# Patient Record
Sex: MALE | Race: WHITE
[De-identification: names, ages, dates, MRNs, and addresses within clinical notes are randomized per-mention and may not be internally consistent; named-entity substitution may affect disease eponyms.]

---

## 2018-10-25 ENCOUNTER — HOSPITAL ENCOUNTER (OUTPATIENT)
Dept: HOSPITAL 19 - COL.RAD | Age: 38
End: 2018-10-25
Attending: FAMILY MEDICINE
Payer: COMMERCIAL

## 2018-10-25 DIAGNOSIS — R93.0: ICD-10-CM

## 2018-10-25 DIAGNOSIS — R51: Primary | ICD-10-CM

## 2019-09-07 ENCOUNTER — HOSPITAL ENCOUNTER (EMERGENCY)
Dept: HOSPITAL 19 - COL.ER | Age: 39
Discharge: HOME | End: 2019-09-07
Payer: COMMERCIAL

## 2019-09-07 VITALS — SYSTOLIC BLOOD PRESSURE: 119 MMHG | DIASTOLIC BLOOD PRESSURE: 85 MMHG | HEART RATE: 67 BPM

## 2019-09-07 VITALS — BODY MASS INDEX: 31.2 KG/M2 | HEIGHT: 72.01 IN | WEIGHT: 230.38 LBS

## 2019-09-07 DIAGNOSIS — F32.9: ICD-10-CM

## 2019-09-07 DIAGNOSIS — R07.89: Primary | ICD-10-CM

## 2019-09-07 LAB
ALBUMIN SERPL-MCNC: 4.8 GM/DL (ref 3.5–5)
ALP SERPL-CCNC: 78 U/L (ref 50–136)
ALT SERPL-CCNC: 18 U/L (ref 21–72)
ANION GAP SERPL CALC-SCNC: 12 MMOL/L (ref 7–16)
AST SERPL-CCNC: 26 U/L (ref 15–37)
BASOPHILS # BLD: 0.1 10*3/UL (ref 0–0.2)
BASOPHILS NFR BLD AUTO: 0.8 % (ref 0–2)
BILIRUB SERPL-MCNC: 0.5 MG/DL (ref 0–1)
BUN SERPL-MCNC: 18 MG/DL (ref 9–20)
CALCIUM SERPL-MCNC: 9.2 MG/DL (ref 8.4–10.2)
CHLORIDE SERPL-SCNC: 106 MMOL/L (ref 98–107)
CO2 SERPL-SCNC: 24 MMOL/L (ref 22–30)
CREAT SERPL-SCNC: 1 UMOL/L (ref 0.66–1.25)
CRP SERPL-MCNC: 0.5 MG/DL (ref 0–0.9)
EOSINOPHIL # BLD: 0.2 10*3/UL (ref 0–0.7)
EOSINOPHIL NFR BLD: 1.3 % (ref 0–4)
ERYTHROCYTE [DISTWIDTH] IN BLOOD BY AUTOMATED COUNT: 13.5 % (ref 11.5–14.5)
GLUCOSE SERPL-MCNC: 94 MG/DL (ref 74–106)
GRANULOCYTES # BLD AUTO: 68.2 % (ref 42.2–75.2)
HCT VFR BLD AUTO: 45.6 % (ref 42–52)
HGB BLD-MCNC: 15.5 G/DL (ref 13.5–18)
LIPASE SERPL-CCNC: 91 U/L (ref 23–300)
LYMPHOCYTES # BLD: 2.7 10*3/UL (ref 1.2–3.4)
LYMPHOCYTES NFR BLD: 21.6 % (ref 20–51)
MCH RBC QN AUTO: 29 PG (ref 27–31)
MCHC RBC AUTO-ENTMCNC: 34 G/DL (ref 33–37)
MCV RBC AUTO: 86 FL (ref 80–100)
MONOCYTES # BLD: 1 10*3/UL (ref 0.1–0.6)
MONOCYTES NFR BLD AUTO: 7.9 % (ref 1.7–9.3)
NEUTROPHILS # BLD: 8.6 10*3/UL (ref 1.4–6.5)
PLATELET # BLD AUTO: 318 K/MM3 (ref 130–400)
PMV BLD AUTO: 11 FL (ref 7.4–10.4)
POTASSIUM SERPL-SCNC: 3.8 MMOL/L (ref 3.4–5)
PROT SERPL-MCNC: 8.1 GM/DL (ref 6.4–8.2)
RBC # BLD AUTO: 5.32 M/MM3 (ref 4.2–5.6)
SODIUM SERPL-SCNC: 143 MMOL/L (ref 137–145)
TROPONIN I SERPL-MCNC: < 0.012 NG/ML (ref 0–0.04)

## 2022-03-21 ENCOUNTER — HOSPITAL ENCOUNTER (EMERGENCY)
Dept: HOSPITAL 19 - COL.ER | Age: 42
Discharge: HOME | End: 2022-03-21
Attending: EMERGENCY MEDICINE
Payer: COMMERCIAL

## 2022-03-21 VITALS — HEIGHT: 72.01 IN | WEIGHT: 239.42 LBS | BODY MASS INDEX: 32.43 KG/M2

## 2022-03-21 VITALS — DIASTOLIC BLOOD PRESSURE: 58 MMHG | HEART RATE: 67 BPM | SYSTOLIC BLOOD PRESSURE: 111 MMHG

## 2022-03-21 VITALS — TEMPERATURE: 97.4 F

## 2022-03-21 DIAGNOSIS — R07.89: ICD-10-CM

## 2022-03-21 DIAGNOSIS — F41.9: Primary | ICD-10-CM

## 2022-03-21 DIAGNOSIS — Z20.822: ICD-10-CM

## 2022-03-21 LAB
ALBUMIN SERPL-MCNC: 4.2 GM/DL (ref 3.5–5)
ALP SERPL-CCNC: 86 U/L (ref 40–150)
ALT SERPL-CCNC: 30 U/L (ref 0–55)
ANION GAP SERPL CALC-SCNC: 10 MMOL/L (ref 7–16)
AST SERPL-CCNC: 19 U/L (ref 5–34)
BASOPHILS # BLD: 0.1 K/MM3 (ref 0–0.2)
BASOPHILS NFR BLD AUTO: 0.8 % (ref 0–2)
BILIRUB SERPL-MCNC: 0.4 MG/DL (ref 0.2–1.2)
BUN SERPL-MCNC: 17 MG/DL (ref 9–21)
CALCIUM SERPL-MCNC: 9.1 MG/DL (ref 8.4–10.2)
CHLORIDE SERPL-SCNC: 106 MMOL/L (ref 98–107)
CO2 SERPL-SCNC: 23 MMOL/L (ref 22–29)
CREAT SERPL-SCNC: 0.98 MG/DL (ref 0.72–1.25)
EOSINOPHIL # BLD: 0.2 K/MM3 (ref 0–0.7)
EOSINOPHIL NFR BLD: 1.7 % (ref 0–4)
ERYTHROCYTE [DISTWIDTH] IN BLOOD BY AUTOMATED COUNT: 13.7 % (ref 11.5–14.5)
GLUCOSE SERPL-MCNC: 95 MG/DL (ref 70–99)
GRANULOCYTES # BLD AUTO: 69 % (ref 42.2–75.2)
HCT VFR BLD AUTO: 44.9 % (ref 42–52)
HGB BLD-MCNC: 15.3 G/DL (ref 13.5–18)
LYMPHOCYTES # BLD: 2.3 K/MM3 (ref 1.2–3.4)
LYMPHOCYTES NFR BLD: 19.1 % (ref 20–51)
MCH RBC QN AUTO: 29 PG (ref 27–31)
MCHC RBC AUTO-ENTMCNC: 34 G/DL (ref 33–37)
MCV RBC AUTO: 86 FL (ref 80–100)
MONOCYTES # BLD: 1.1 K/MM3 (ref 0.1–0.6)
MONOCYTES NFR BLD AUTO: 8.9 % (ref 1.7–9.3)
NEUTROPHILS # BLD: 8.1 K/MM3 (ref 1.4–6.5)
PLATELET # BLD AUTO: 334 K/MM3 (ref 130–400)
PMV BLD AUTO: 11.1 FL (ref 7.4–10.4)
POTASSIUM SERPL-SCNC: 3.8 MMOL/L (ref 3.5–4.5)
PROT SERPL-MCNC: 7.4 GM/DL (ref 6.2–8.1)
RBC # BLD AUTO: 5.24 M/MM3 (ref 4.2–5.6)
SODIUM SERPL-SCNC: 139 MMOL/L (ref 136–145)
TROPONIN I SERPL-MCNC: < 0.01 NG/ML (ref 0–0.03)

## 2022-04-08 ENCOUNTER — HOSPITAL ENCOUNTER (OUTPATIENT)
Dept: HOSPITAL 19 - COL.VAS | Age: 42
End: 2022-04-08
Attending: FAMILY MEDICINE
Payer: COMMERCIAL

## 2022-04-08 DIAGNOSIS — R07.9: Primary | ICD-10-CM

## 2022-04-08 DIAGNOSIS — R06.02: ICD-10-CM

## 2022-08-31 ENCOUNTER — HOSPITAL ENCOUNTER (OUTPATIENT)
Dept: HOSPITAL 19 - COL.RAD | Age: 42
End: 2022-08-31
Attending: FAMILY MEDICINE
Payer: COMMERCIAL

## 2022-08-31 DIAGNOSIS — R59.0: Primary | ICD-10-CM

## 2022-09-03 ENCOUNTER — HOSPITAL ENCOUNTER (OUTPATIENT)
Dept: HOSPITAL 19 - COL.ER | Age: 42
Setting detail: OBSERVATION
LOS: 1 days | Discharge: HOME | End: 2022-09-04
Attending: INTERNAL MEDICINE | Admitting: INTERNAL MEDICINE
Payer: COMMERCIAL

## 2022-09-03 VITALS — SYSTOLIC BLOOD PRESSURE: 113 MMHG | HEART RATE: 93 BPM | DIASTOLIC BLOOD PRESSURE: 52 MMHG | TEMPERATURE: 98.8 F

## 2022-09-03 VITALS — DIASTOLIC BLOOD PRESSURE: 63 MMHG | TEMPERATURE: 98.3 F | HEART RATE: 91 BPM | SYSTOLIC BLOOD PRESSURE: 119 MMHG

## 2022-09-03 VITALS — HEIGHT: 72.01 IN | BODY MASS INDEX: 30.76 KG/M2 | WEIGHT: 227.08 LBS

## 2022-09-03 VITALS — TEMPERATURE: 97.5 F | HEART RATE: 88 BPM | SYSTOLIC BLOOD PRESSURE: 106 MMHG | DIASTOLIC BLOOD PRESSURE: 59 MMHG

## 2022-09-03 DIAGNOSIS — Z20.822: ICD-10-CM

## 2022-09-03 DIAGNOSIS — R59.0: ICD-10-CM

## 2022-09-03 DIAGNOSIS — F32.A: ICD-10-CM

## 2022-09-03 DIAGNOSIS — R79.89: ICD-10-CM

## 2022-09-03 DIAGNOSIS — B27.90: ICD-10-CM

## 2022-09-03 DIAGNOSIS — Z79.899: ICD-10-CM

## 2022-09-03 DIAGNOSIS — D73.5: Primary | ICD-10-CM

## 2022-09-03 DIAGNOSIS — D72.829: ICD-10-CM

## 2022-09-03 LAB
ALBUMIN SERPL-MCNC: 3.1 GM/DL (ref 3.5–5)
ALP SERPL-CCNC: 289 U/L (ref 40–150)
ALT SERPL-CCNC: 378 U/L (ref 0–55)
ANION GAP SERPL CALC-SCNC: 14 MMOL/L (ref 7–16)
ANISOCYTOSIS BLD QL: (no result)
AST SERPL-CCNC: 199 U/L (ref 5–34)
BILIRUB SERPL-MCNC: 11.1 MG/DL (ref 0.2–1.2)
BUN SERPL-MCNC: 16 MG/DL (ref 9–21)
CALCIUM SERPL-MCNC: 8.7 MG/DL (ref 8.4–10.2)
CHLORIDE SERPL-SCNC: 103 MMOL/L (ref 98–107)
CO2 SERPL-SCNC: 19 MMOL/L (ref 22–29)
CREAT SERPL-SCNC: 1.13 MG/DL (ref 0.72–1.25)
ERYTHROCYTE [DISTWIDTH] IN BLOOD BY AUTOMATED COUNT: 15.9 % (ref 11.5–14.5)
GLUCOSE SERPL-MCNC: 124 MG/DL (ref 70–99)
GLUCOSE UR QL STRIP.AUTO: (no result)
HCT VFR BLD AUTO: 39.6 % (ref 42–52)
HETEROPH AB SER QL LA: POSITIVE
HGB BLD-MCNC: 13.4 G/DL (ref 13.5–18)
INR BLD: 1.3 (ref 0.8–3)
LIPASE SERPL-CCNC: 61 U/L (ref 8–78)
LYMPHOCYTES NFR BLD MANUAL: 53 % (ref 20–51)
MCH RBC QN AUTO: 29 PG (ref 27–31)
MCHC RBC AUTO-ENTMCNC: 34 G/DL (ref 33–37)
MCV RBC AUTO: 85 FL (ref 80–100)
MONOCYTES NFR BLD: 5 % (ref 1.7–9.3)
NEUTS BAND NFR BLD: 5 % (ref 0–10)
NEUTS SEG NFR BLD MANUAL: 37 % (ref 42–75.2)
PH UR STRIP.AUTO: 6 [PH] (ref 5–8.5)
PLATELET # BLD AUTO: 316 K/MM3 (ref 130–400)
PLATELET BLD QL SMEAR: NORMAL
PMV BLD AUTO: 11.1 FL (ref 7.4–10.4)
POTASSIUM SERPL-SCNC: 3.7 MMOL/L (ref 3.5–4.5)
PROT SERPL-MCNC: 6.9 GM/DL (ref 6.2–8.1)
PROTHROMBIN TIME: 15.2 SECONDS (ref 9.7–12.8)
RBC # BLD AUTO: 4.66 M/MM3 (ref 4.2–5.6)
RBC # UR: (no result) /HPF (ref 0–2)
SODIUM SERPL-SCNC: 136 MMOL/L (ref 136–145)
SP GR UR STRIP.AUTO: <=1.005 (ref 1–1.03)
SQUAMOUS # URNS: (no result) /HPF (ref 0–10)
UA DIPSTICK PNL UR STRIP.AUTO: (no result)
URN COLLECT METHOD CLASS: (no result)
UROBILINOGEN UR STRIP.AUTO-MCNC: >=8 E.U/DL (ref 0.2–1)

## 2022-09-03 PROCEDURE — G0378 HOSPITAL OBSERVATION PER HR: HCPCS

## 2022-09-03 NOTE — NUR
Initial shift assessment completed, watching some TV, wife at bedside,, denies
pain at this time but overall just doesnt feel his normal-tired,  jaundice
present, up in room as tolerated, steady on feet, IV fluids of LR at 100cc/hr.

## 2022-09-04 VITALS — TEMPERATURE: 98.6 F | HEART RATE: 82 BPM | SYSTOLIC BLOOD PRESSURE: 103 MMHG | DIASTOLIC BLOOD PRESSURE: 52 MMHG

## 2022-09-04 VITALS — HEART RATE: 79 BPM | TEMPERATURE: 98.3 F | DIASTOLIC BLOOD PRESSURE: 53 MMHG | SYSTOLIC BLOOD PRESSURE: 101 MMHG

## 2022-09-04 VITALS — TEMPERATURE: 97.5 F | DIASTOLIC BLOOD PRESSURE: 58 MMHG | SYSTOLIC BLOOD PRESSURE: 121 MMHG | HEART RATE: 78 BPM

## 2022-09-04 LAB
ALBUMIN SERPL-MCNC: 2.8 GM/DL (ref 3.5–5)
ALP SERPL-CCNC: 249 U/L (ref 40–150)
ALT SERPL-CCNC: 353 U/L (ref 0–55)
ANION GAP SERPL CALC-SCNC: 9 MMOL/L (ref 7–16)
AST SERPL-CCNC: 208 U/L (ref 5–34)
BASOPHILS NFR BLD MANUAL: 1 % (ref 0–2)
BILIRUB SERPL-MCNC: 10.2 MG/DL (ref 0.2–1.2)
BUN SERPL-MCNC: 13 MG/DL (ref 9–21)
CALCIUM SERPL-MCNC: 8.5 MG/DL (ref 8.4–10.2)
CHLORIDE SERPL-SCNC: 105 MMOL/L (ref 98–107)
CO2 SERPL-SCNC: 23 MMOL/L (ref 22–29)
CREAT SERPL-SCNC: 0.87 MG/DL (ref 0.72–1.25)
EOSINOPHIL NFR BLD: 1 % (ref 0–4)
ERYTHROCYTE [DISTWIDTH] IN BLOOD BY AUTOMATED COUNT: 16 % (ref 11.5–14.5)
GLUCOSE SERPL-MCNC: 101 MG/DL (ref 70–99)
HCT VFR BLD AUTO: 36.9 % (ref 42–52)
HGB BLD-MCNC: 12.3 G/DL (ref 13.5–18)
LYMPHOCYTES NFR BLD MANUAL: 64 % (ref 20–51)
MCH RBC QN AUTO: 29 PG (ref 27–31)
MCHC RBC AUTO-ENTMCNC: 33 G/DL (ref 33–37)
MCV RBC AUTO: 88 FL (ref 80–100)
MONOCYTES NFR BLD: 10 % (ref 1.7–9.3)
NEUTS BAND NFR BLD: 3 % (ref 0–10)
NEUTS SEG NFR BLD MANUAL: 21 % (ref 42–75.2)
PLATELET # BLD AUTO: 278 K/MM3 (ref 130–400)
PLATELET BLD QL SMEAR: NORMAL
PMV BLD AUTO: 11.5 FL (ref 7.4–10.4)
POTASSIUM SERPL-SCNC: 3.5 MMOL/L (ref 3.5–4.5)
PROT SERPL-MCNC: 6.1 GM/DL (ref 6.2–8.1)
RBC # BLD AUTO: 4.2 M/MM3 (ref 4.2–5.6)
SODIUM SERPL-SCNC: 137 MMOL/L (ref 136–145)

## 2022-09-04 NOTE — NUR
Shift assessment completed. Scheduled medications given. Patient A&O. VSS.
Patient noted to be jaundice.  Fluids running as ordered. Dyspnea upon
exertion noted. Patient c/o dry nagging cough. Patient denies any pain,
discomfort, SOA, or further need at this time. Call light in reach.

## 2022-09-04 NOTE — NUR
Patient deemed fit for discharge. IV DC'd, catheter intact, no s/s of
phlebitis. Discharge education/instructions discussed. All questions answered.
Patient denies any pain, discomfort, SOA, or further needs at this time. VSS.
Patient A&O. Patient escorted from building via wheelchair by [facility]
Staff. Wife transporting home.

## 2022-09-06 LAB — PATH REV BLD -IMP: (no result)

## 2024-09-05 ENCOUNTER — HOSPITAL ENCOUNTER (OUTPATIENT)
Dept: HOSPITAL 19 - SDCO | Age: 44
Discharge: HOME | End: 2024-09-05
Attending: FAMILY MEDICINE
Payer: COMMERCIAL

## 2024-09-05 VITALS — DIASTOLIC BLOOD PRESSURE: 75 MMHG | TEMPERATURE: 97.5 F | HEART RATE: 77 BPM | SYSTOLIC BLOOD PRESSURE: 117 MMHG

## 2024-09-05 VITALS — BODY MASS INDEX: 33.56 KG/M2 | WEIGHT: 247.8 LBS | HEIGHT: 72 IN

## 2024-09-05 VITALS — SYSTOLIC BLOOD PRESSURE: 118 MMHG | DIASTOLIC BLOOD PRESSURE: 84 MMHG | HEART RATE: 77 BPM

## 2024-09-05 VITALS — HEART RATE: 79 BPM | DIASTOLIC BLOOD PRESSURE: 89 MMHG | SYSTOLIC BLOOD PRESSURE: 133 MMHG

## 2024-09-05 VITALS — SYSTOLIC BLOOD PRESSURE: 132 MMHG | TEMPERATURE: 97.5 F | DIASTOLIC BLOOD PRESSURE: 83 MMHG | HEART RATE: 78 BPM

## 2024-09-05 DIAGNOSIS — E66.9: ICD-10-CM

## 2024-09-05 DIAGNOSIS — Z12.11: Primary | ICD-10-CM

## 2024-09-05 NOTE — NUR
Discharge instructions reviewed with kymberly at 1043. He complaints of some
dizziness, which he reports happens when he gets any type of anesthesia.
Encouraged to rest in recliner with eyes shut. Denies nausea.

## 2024-09-05 NOTE — NUR
The patient ambulated back to [facility] independently using a steady gait and
appeared to tolerate the activity well. Vital signs obtained. Consent signed.
20G IV started in right wrist with one stick, LR infusing. Assessment
completed. Home medications reconcilled. Warm blanket provided. Denies any
further needs at this time.

## 2024-09-05 NOTE — NUR
1100- Patient dressed at this time without issue.
 
1105- IV removed.
 
1110- Patient down to family members vehicle via W/C. Denies complaints at
that time.

## 2024-09-05 NOTE — NUR
1020- Patient returns to ENDO bay 3 via cart. Transfers to recliner chair with
assistance of 2, gait steady. VSS. Bedside handoff received from JEFFREY Scott. IV
fluids infusing through left hand IV site without complications. Call light
within reach. Pepsi and muffin given. Denies complaints.